# Patient Record
Sex: MALE | Race: WHITE
[De-identification: names, ages, dates, MRNs, and addresses within clinical notes are randomized per-mention and may not be internally consistent; named-entity substitution may affect disease eponyms.]

---

## 2018-03-14 ENCOUNTER — HOSPITAL ENCOUNTER (OUTPATIENT)
Dept: HOSPITAL 92 - RAD-FRANK | Age: 63
Discharge: HOME | End: 2018-03-14
Attending: NURSE PRACTITIONER
Payer: COMMERCIAL

## 2018-03-14 DIAGNOSIS — R10.9: Primary | ICD-10-CM

## 2018-03-14 PROCEDURE — 74018 RADEX ABDOMEN 1 VIEW: CPT

## 2018-03-14 NOTE — RAD
ABDOMEN ONE VIEW:

 

History: Abdominal pain. 

 

FINDINGS: 

Gas and stool are apparent within the colon and rectum. Small bowel gas pattern is nonspecific. Urina
ry tract calcifications are not reliably demonstrated on this exam. Phleboliths and prostatic calcifi
cations project over the pelvis. There are degenerative changes of the hips and lumbar spine. 

 

IMPRESSION: 

No significant abnormalities are demonstrated. 

 

POS: USMAN

## 2022-02-23 ENCOUNTER — HOSPITAL ENCOUNTER (OUTPATIENT)
Dept: HOSPITAL 92 - RAD-FRANK | Age: 67
Discharge: HOME | End: 2022-02-23
Attending: NURSE PRACTITIONER
Payer: MEDICARE

## 2022-02-23 DIAGNOSIS — M79.641: Primary | ICD-10-CM
